# Patient Record
(demographics unavailable — no encounter records)

---

## 2024-11-18 NOTE — PHYSICAL EXAM
[Right] : right hip [NL (120)] : flexion 120 degrees [NL (30)] : extension 30 degrees [NL (35)] : adduction 35 degrees [NL (45)] : internal rotation 45 degrees [5___] : flexion 5[unfilled]/5 [NL (140)] : flexion 140 degrees [NL (0)] : extension 0 degrees [] : non-antalgic [FreeTextEntry8] : proximal quad muscle tenderness

## 2024-11-18 NOTE — ASSESSMENT
[FreeTextEntry1] : We reviewed the findings and the history. Questions were answered and concerns addressed. The options were outlined. PT planned. Mobic advised.  Patient seen by Cintia NOVA who determined the assessment and plan and participated in all aspects of the office encounter.

## 2024-11-18 NOTE — REASON FOR VISIT
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  No numbness.  No sandy groin or knee pain.  Motrin not helping.   [employer]

## 2024-11-18 NOTE — HISTORY OF PRESENT ILLNESS
[Right Leg] : right leg [7] : 7 [5] : 5 [Dull/Aching] : dull/aching [Localized] : localized [Intermittent] : intermittent [Exercising] : exercising [Student] : Work status: student [] : Post Surgical Visit: no [FreeTextEntry5] : Patient complains of pain in her right quad area for the past 2 months, no specific injury, pt plays soccer for [employer] and club team. pain gets better with rest but comes back when she plays soccer.

## 2024-12-17 NOTE — DISCUSSION/SUMMARY
[de-identified] : I spoke with the urgent care provider, reviewed notes, and images.  The patients condition is acute.  Confounding medical conditions/concerns: None  Tests/Studies Independently Interpreted Today: X-Ray right femur is benign, no obvious bony abnormalities.   Signs and symptoms of quadriceps strain vs femoral stress reaction tearing upon clinical examination. We discussed various treatment options, rehab, surgery, and NSAIDs. The risks of each treatment modality was also reviewed. Given this has been ongoing for about three months we will start with obtaining an MRI right femur  to evaluate for a potential surgical quad tear. In the meantime, we will initiate anti-inflammatory medication and therapy.   Recommended she avoid any receptive stress activity in the interim.   Prescribed the patient Motrin 600mgs and discussed risks of side effects and timing and management of medication. Side effects include but are not limited to gi ulcers and irritation, as well as kidney failure and bleeding issues.  Follow up in 3 weeks

## 2024-12-17 NOTE — DISCUSSION/SUMMARY
[de-identified] : I spoke with the urgent care provider, reviewed notes, and images.  The patients condition is acute.  Confounding medical conditions/concerns: None  Tests/Studies Independently Interpreted Today: X-Ray right femur is benign, no obvious bony abnormalities.   Signs and symptoms of quadriceps strain vs femoral stress reaction tearing upon clinical examination. We discussed various treatment options, rehab, surgery, and NSAIDs. The risks of each treatment modality was also reviewed. Given this has been ongoing for about three months we will start with obtaining an MRI right femur  to evaluate for a potential surgical quad tear. In the meantime, we will initiate anti-inflammatory medication and therapy.   Recommended she avoid any receptive stress activity in the interim.   Prescribed the patient Motrin 600mgs and discussed risks of side effects and timing and management of medication. Side effects include but are not limited to gi ulcers and irritation, as well as kidney failure and bleeding issues.  Follow up in 3 weeks

## 2024-12-17 NOTE — PHYSICAL EXAM
[Right] : right hip [2+] : posterior tibialis pulse: 2+ [Normal] : inner thigh sensation normal [] : no scars [FreeTextEntry8] : Tender proximal to mid quadriceps muscle  [de-identified] : 4/5 quad strength  [de-identified] : +triple hop

## 2024-12-17 NOTE — PHYSICAL EXAM
[Right] : right hip [2+] : posterior tibialis pulse: 2+ [Normal] : inner thigh sensation normal [] : no scars [FreeTextEntry8] : Tender proximal to mid quadriceps muscle  [de-identified] : 4/5 quad strength  [de-identified] : +triple hop

## 2024-12-17 NOTE — HISTORY OF PRESENT ILLNESS
[de-identified] : Patient is here for right quad pain that began in 8/2024, not due to injury. 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 Experiences numbness post playing soccer. Pain is located above knee, and will radiate with activity. Went to [facility] urgent care, got XR - quad strain. Attending PT at [facility] [place]. Notes no improvement. Rested for 1 week, and returned to play with discomfort. 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

## 2025-01-13 NOTE — PHYSICAL EXAM
[Right] : right hip [NL (120)] : flexion 120 degrees [NL (30)] : extension 30 degrees [2+] : posterior tibialis pulse: 2+ [] : no ecchymosis [FreeTextEntry8] : Tender proximal to mid quadriceps muscle  [de-identified] : 4+/5 quad strength  [de-identified] : +triple hop

## 2025-01-13 NOTE — DATA REVIEWED
[MRI] : MRI [Right] : of the right [Lower Extremities] : lower extremities [FreeTextEntry1] : MRI of the R femur revealed focal high grade tear involving the posterolateral aspect of the proximal rectus femoris muscle belly

## 2025-01-13 NOTE — HISTORY OF PRESENT ILLNESS
[de-identified] : Patient is here to follow up on MRI results for right femur. Notes improvement. Has no pain on daily basis, only with playing travel soccer; also plays for [employer].

## 2025-01-13 NOTE — DISCUSSION/SUMMARY
[de-identified] : The patient's condition is acute Confounding medical conditions/concerns: N/A Tests/Studies Independently Interpreted Today: MRI of the R femur revealed focal high grade tear involving the posterolateral aspect of the proximal rectus femoris muscle belly ------------------------------------------------------------------------------------------------------------------  We reviewed the mri findings and discussed treatment options, both operative and non operative for pt's quad tearing. Discussed risks of potential surgery. However, due to the risks of the surgery, we will try NSAIDs and therapy. Discussed management of medication.  Would rec relative rest Discussed activity modification. At this time would rec when she is in between seasons to rest Rec she return to PT- provided recommendations   Prescribed patient Motrin 600mgs and discussed risks of side effects and timing and management of medication. Side effects include but are not limited to gi ulcers and irritation, as well as kidney failure and bleeding issues.   f/u in 4 weeks if pain persist   I, Diandra Brock, attest that this documentation has been prepared under the direction and in the presence of Provider Dr. Kali Busch

## 2025-01-13 NOTE — PHYSICAL EXAM
[Right] : right hip [NL (120)] : flexion 120 degrees [NL (30)] : extension 30 degrees [2+] : posterior tibialis pulse: 2+ [] : no ecchymosis [FreeTextEntry8] : Tender proximal to mid quadriceps muscle  [de-identified] : 4+/5 quad strength  [de-identified] : +triple hop

## 2025-01-13 NOTE — HISTORY OF PRESENT ILLNESS
[de-identified] : Patient is here to follow up on MRI results for right femur. Notes improvement. Has no pain on daily basis, only with playing travel soccer; also plays for [employer].

## 2025-01-13 NOTE — DISCUSSION/SUMMARY
[de-identified] : The patient's condition is acute Confounding medical conditions/concerns: N/A Tests/Studies Independently Interpreted Today: MRI of the R femur revealed focal high grade tear involving the posterolateral aspect of the proximal rectus femoris muscle belly ------------------------------------------------------------------------------------------------------------------  We reviewed the mri findings and discussed treatment options, both operative and non operative for pt's quad tearing. Discussed risks of potential surgery. However, due to the risks of the surgery, we will try NSAIDs and therapy. Discussed management of medication.  Would rec relative rest Discussed activity modification. At this time would rec when she is in between seasons to rest Rec she return to PT- provided recommendations   Prescribed patient Motrin 600mgs and discussed risks of side effects and timing and management of medication. Side effects include but are not limited to gi ulcers and irritation, as well as kidney failure and bleeding issues.   f/u in 4 weeks if pain persist   I, Diandra Brock, attest that this documentation has been prepared under the direction and in the presence of Provider Dr. Kali Busch